# Patient Record
(demographics unavailable — no encounter records)

---

## 2025-04-24 NOTE — ADDENDUM
[FreeTextEntry1] : Patient with MILD CAL (G47.33), mild oxygen desaturation (G47.34) on recent HST, admits to Depression, Anxiety (F41.9) and excessive daytime somnolence (G47.19) with ESS 11, AHI 8.2, 72 sleep disordered breathing events, lowest oxygen desaturation 82%.  EPWORTH SLEEPINESS SCALE How likely are you to doze off or fall asleep in the situations described below, in contrast to feeling just tired? This refers to your usual way of life in recent times. Even if you haven't done some of these things recently, try to work out how they would have affected you. Use the following scale to choose one most appropriate number for each situation.......Chance of dozin= never. 1= slight. 2= moderate. 3= high.  CHANCE OF DOZING............SITUATION 2.............................................Sitting and reading 1.............................................Watching TV 1.............................................Sitting inactive in a public place (eg a theatre or a meeting) 2.............................................As a passenger in a car for an hour without a break 2.............................................Lying down to rest in the afternoon when circumstances permit 0.............................................Sitting and talking to someone 2.............................................Sitting quietly after lunch without alcohol 1.............................................In a car, while stopped for a few minutes in traffic  11............................................TOTAL ESS SCORE.

## 2025-04-24 NOTE — ASSESSMENT
[FreeTextEntry1] : 61 y/o M with h/o Severe CAL--47.7/hr, diagnosed in 2014, with latest sleep study from 2023 showing Mild CAL--AHI 8.2/hr, after a 30-lb weight loss, treated on APAP, presents with residual snoring, feeling tired, and malfunctioning equipment.  Wife states that the patient falls asleep easily if inactive and requires more naps recently. Though he denies excessive daytime somnolence, he can doze off primarily while engaged in passive activities. He denies drowsy driving and AM headaches. Gained 20-lbs since his last visit.  The patient is unable to use his CPAP on Quaker holidays and on Sabbath days. He has new insurance, and they are inquiring about a new pap device as they state the current PAP device makes noise and is malfunctioning and a device where it can function for them for Sabbath and Quaker holidays.  The patient was also seen in ER last month for chest pain with a negative cardiac work up. Wife reports dyspnea on exertion, especially when he mops the floor. He has cardiac follow up next week Wednesday.  #1 CAL Discussed therapy and compliance data with the patient and spouse. Therapy-based AHI of 3.9/hr on 4-20 cH20, with 95th percentile pressure of 10.7 cmH20. Average usage of 8-hours and 50-minutes. Excessive leak seen.  --Rx to assess / repair / replace / provide loaner pap device given noise from current unit with request to process order under new insurance was requested to [employer] company, [employer]. --Sent email to Edson Saucedo from [facility] and Cass Mcmillan from [employer] to determine if there is a feature on the Airsense device that can be used for when he observes Sabbath and other Jewish holidays.  --Referral for mask fitting for leak from his current nasal mask, likely related to weight gain.  --Weight loss strongly encouraged. The patient and spouse verbalized understanding on the role of weight loss as it relates to CAL.  --The ramifications of untreated sleep apnea and the importance of continued treatment was discussed with the patient.   #2. REGAN Resting pulse ox of 97 %, HR 73-74 after ambulation pox 95 %, HR 95%  B/L lung sounds CTA PFTs ordered.   F/U after PFTs.

## 2025-04-24 NOTE — REVIEW OF SYSTEMS
[Fatigue] : fatigue [Recent Wt Gain (___ Lbs)] : recent [unfilled] ~Ulb weight gain [Snoring] : snoring [Witnessed Apneas] : witnessed apnea [Depression] : depression [Anxious] : anxious [Negative] : Musculoskeletal [Nasal Congestion] : no nasal congestion [Postnasal Drip] : no postnasal drip [A.M. Dry Mouth] : no a.m. dry mouth [Chest Pain] : no chest pain [CHF] : no congestive heart failure [Thyroid Disease] : no thyroid disease [Diabetes] : no diabetes  [History of Iron Deficiency] : no history of iron deficiency [A.M. Headache] : no headache present upon awakening [de-identified] : controlled with medications

## 2025-04-24 NOTE — HISTORY OF PRESENT ILLNESS
[Snoring] : snoring [Unintentional Sleep While Inactive] : unintentional sleep while inactive [Recent  Weight Gain] : recent weight gain [To Bed: ___] : ~he/she~ goes to bed at [unfilled] [Arises: ___] : arises at [unfilled] [Sleep Onset Latency: ___ minutes] : sleep onset latency of [unfilled] minutes reported [Nocturnal Awakenings: ___] : ~he/she~ typically has [unfilled] nocturnal awakenings [TST: ___] : Total sleep time is [unfilled] [Awakes Unrefreshed] : awakening unrefreshed [Daytime Sleep: ___] : daytime sleep: [unfilled] [FreeTextEntry1] : 59 y/o M with h/o Severe CAL--47.7/hr, diagnosed in 2014, with latest sleep study from 2023 showing Mild CAL--AHI 8.2/hr, after a 30-lb weight loss, treated on APAP, presents with residual snoring, feeling tired, and malfunctioning equipment.  Wife states that the patient falls asleep easily if inactive and requires more naps recently. Though he denies excessive daytime somnolence, he can doze off primarily while engaged in passive activities. He denies drowsy driving and AM headaches. Gained 20-lbs since his last visit. Tolerating nasal mask and pressure well. The patient is unable to use his CPAP on Jewish holidays and on Sabbath days. He has new insurance, and they are inquiring about a new pap device as they state the current PAP device makes noise and is malfunctioning and a device where it can function for them for Sabbath and Hoahaoism holidays.  The patient was also seen in ER last month for chest pain with a negative cardiac work up. Wife reports dyspnea on exertion, especially when he mops the floor. He has cardiac follow up next week Wednesday.  SLEEP STUDIES: Initial diagnostic study performed at an outside center (2/1/14) showed an AHI of 47.7/hr.  Latest APAP titration (9/2014) showed an optimal pressure of 11 cmH20.  He had persistent hypersomnolence despite effective CPAP and underwent an MSLT (12/31/2014) that showed a MSOL of 4-minutes, with no SOREMs and was prescribed Nuvigil. However, he states he has not used Nuvigil in years, as his excessive daytime somnolence resolved.   HSAT 10-: AHI 8.2/hr, T 90 of 0.4%. Weighed a 165 lbs.   TX: APAP 4-20 cmH20. Device: AirSense 11 Autoset provided by [employer], [facility], on 12/18/2023  Comorbid medical conditions include hyperlipidemia, and anxiety-- on Geodon and Cymbalta.  [Frequent Nocturnal Awakening] : no nocturnal awakening [Unintentional Sleep while Active] : no unintentional sleep while active [Awakes with Headache] : no headache upon awakening [Awakening With Dry Mouth] : no dry mouth upon awakening [DIS] : no DIS [DMS] : no DMS [Unusual Sleep Behavior] : no unusual sleep behavior [Hypersomnolence] : no hypersomnolence [Cataplexy] : no cataplexy [Sleep Paralysis] : no sleep paralysis [Hypnagogic Hallucinations] : no hallucinations when falling asleep [Hypnopompic Hallucinations] : no hallucinations when awakening [Lower Extremity Discomfort] : no lower extremity discomfort in evening or at bedtime [ESS] : 5